# Patient Record
Sex: MALE | Race: WHITE | NOT HISPANIC OR LATINO
[De-identification: names, ages, dates, MRNs, and addresses within clinical notes are randomized per-mention and may not be internally consistent; named-entity substitution may affect disease eponyms.]

---
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
## 2018-11-25 ENCOUNTER — EMERGENCY (EMERGENCY)
Facility: HOSPITAL | Age: 18
LOS: 1 days | Discharge: HOME | End: 2018-11-25
Attending: EMERGENCY MEDICINE | Admitting: EMERGENCY MEDICINE
Payer: COMMERCIAL

## 2018-11-25 VITALS
SYSTOLIC BLOOD PRESSURE: 136 MMHG | RESPIRATION RATE: 18 BRPM | TEMPERATURE: 99 F | DIASTOLIC BLOOD PRESSURE: 86 MMHG | OXYGEN SATURATION: 97 % | HEART RATE: 100 BPM

## 2018-11-25 DIAGNOSIS — F45.22 BODY DYSMORPHIC DISORDER: ICD-10-CM

## 2018-11-25 PROCEDURE — 99283 EMERGENCY DEPT VISIT LOW MDM: CPT

## 2018-11-25 PROCEDURE — 90792 PSYCH DIAG EVAL W/MED SRVCS: CPT

## 2018-11-25 NOTE — ED BEHAVIORAL HEALTH ASSESSMENT NOTE - SUMMARY
18 year-old with body dysmorphic disorder and limited insight, not currently in treatment, presents with mother with sleep complaint but no suicidal ideations, of 2 year chronicity, not a candidate for involuntary psychiatric admission, does not want to be in a psychiatric hospital, plan is referral for outpatient treatment.

## 2018-11-25 NOTE — ED ADULT NURSE NOTE - CHIEF COMPLAINT QUOTE
pt states for the past year he has had anxiety and been unable to sleep, pt states he has been feeling depressed, denies SI, denies HI. pt states he has not seen a psychologist as of yet.  pt mother states pt never leaves the house and walks around with  towel covering his face.

## 2018-11-25 NOTE — ED BEHAVIORAL HEALTH NOTE - BEHAVIORAL HEALTH NOTE
following discharge mother contacted this provider asking for something "like valium for him to sleep tonight." Discussed with ED provider, do not think that benzo is indicated in this situation of initial insomnia  from the ED without beginning relationship with outpatient provider;  encouraged sleep hygiene, or melatonin/benadryl, and evaluation in the AM by outpatient psychiatry so that medication regimen can be developed for body dysmorphia.

## 2018-11-25 NOTE — ED PROVIDER NOTE - MEDICAL DECISION MAKING DETAILS
serge: many yr hx of anxiety; pt has been reluctant to seek care according to mother. pmh neg and no SI/HI. Psychiatry consulting.

## 2018-11-25 NOTE — ED BEHAVIORAL HEALTH ASSESSMENT NOTE - DESCRIPTION
Vital Signs Last 24 Hrs  T(C): 37.1 (25 Nov 2018 08:33), Max: 37.1 (25 Nov 2018 08:33)  T(F): 98.8 (25 Nov 2018 08:33), Max: 98.8 (25 Nov 2018 08:33)  HR: 100 (25 Nov 2018 08:33) (100 - 100)  BP: 136/86 (25 Nov 2018 08:33) (136/86 - 136/86)  BP(mean): --  RR: 18 (25 Nov 2018 08:33) (18 - 18)  SpO2: 97% (25 Nov 2018 08:33) (97% - 97%)    Patient somewhat anxious to get into gown and take his hat off, but calm and cooperative with process acne lives with mother and brother in [place]

## 2018-11-25 NOTE — ED BEHAVIORAL HEALTH ASSESSMENT NOTE - RISK ASSESSMENT
pt with chronic risk factors for suicide including untreated body dysmorphia, social isolation, limited insight. protective factors include supportive family, lack of substance abuse, lack of major depressions, lack of history of self harm, lack of suicidal ideations.

## 2018-11-25 NOTE — ED BEHAVIORAL HEALTH ASSESSMENT NOTE - HPI (INCLUDE ILLNESS QUALITY, SEVERITY, DURATION, TIMING, CONTEXT, MODIFYING FACTORS, ASSOCIATED SIGNS AND SYMPTOMS)
18 year-old male, single, no children, no history of substance abuse or arrests, lives with mother and brother in [place], not working, graduated high school but did not go to college, with 2 year history of preoccupation with acne that has led to multiple dermatology visits and procedures, cost over 2000 dollars out of pocket, and while acne has largely resolved he remains ruminative about his face, convinced that his appearance is scarred, spends time "looking for another dermatologist" but does not like to leave the house without some covering on his head, stays home without friends or social contacts. he reports that initially the acne was much worse, but was treated and "thank god it is still better, but I still think about the whole situation." He spends time checking his appearance, talks about "my face, I have pictures of how bad it was." He reports that when his mother makes him leave the house, "even like here to the ER, I feel so much better because I am not just focused on it." His mother has been trying to get him in to treatment for the past year, he has visited the [place] ED where he was referred to a psychiatrist but did not go, went once to the [facility], but did not get out of the car, decided he did not want to see a psychiatrist. He woke  up this morning and after recently having had some increased difficulty falling asleep, agreed to come to the ED "to get something for sleep." He denies other symptoms of depression, does not feel anhedonic (enjoys television enjoys exercising), has not generally had trouble with sleep until recently, eats well. He has had no suicidal thoughts, has never tried to hurt himself. No mood elevation. No darnell delusions (he has insight into the exaggerated nature of his perceived physical imperfection). No hallucinations. Consult requested for psychiatric referral, pt not requesting admission to the psychiatric hospital.    Collateral from mother corroborates above, she is very frustrated that he does not seek treatment, she is not sure what to do to help him accept treatment, corroborates that there are no acute safety issues

## 2018-11-25 NOTE — ED ADULT TRIAGE NOTE - CHIEF COMPLAINT QUOTE
pt states for the past year he has had anxiety and been unable to sleep, pt states he has been feeling depressed, denies SI, denies HI. pt states he has not seen a psychologist as of yet. pt states for the past year he has had anxiety and been unable to sleep, pt states he has been feeling depressed, denies SI, denies HI. pt states he has not seen a psychologist as of yet.  pt mother states pt never leaves the house and walks around with  towel covering his face.

## 2018-11-25 NOTE — ED PROVIDER NOTE - OBJECTIVE STATEMENT
serge: hx from pt and mother.   Pt states that he has anxiety for many years, and that he has difficulty sleeping. States that he is bothered by the diff sleeping (4 hours last note) and came to ED. Denies SI/HI. Denies being in behavioral care in the past. No meds. States he has intermittent abd pain for years and had blood testing early this year and told of labs being normal.   yury any PMH

## 2018-11-25 NOTE — ED PROVIDER NOTE - PROGRESS NOTE DETAILS
serge: pt seen by psychiatry and dx appears to be body dysmorphism. Pt needs to follow-up in crisis clinic. serge: pt asks for valium for sleep. pt and mother spoke with psychiatry who did not feel this was in pts best interest. they strongly recc f/u tomorrow at crisis center. Family then asked to speak with me. Made same request. I explained that it was important for the pts long term health that he obtain opt continued care ASAP and that they should go tomorrow. Pt and mom were very concerned about him not sleeping. I suggested 25mg otc benadryl to take tonite only (repeat once tonite if needed but not take any other nites unless recc tomorrow by psychiatry). I again emphasized the need to deal with the chronic condition pt has.

## 2018-11-25 NOTE — ED ADULT NURSE REASSESSMENT NOTE - NS ED NURSE REASSESS COMMENT FT1
pt calm & cooperative d/c by MD pt denies Si/Hi/AVh presently resources provided to pt for follow up care.

## 2018-11-27 ENCOUNTER — OUTPATIENT (OUTPATIENT)
Dept: OUTPATIENT SERVICES | Facility: HOSPITAL | Age: 18
LOS: 1 days | Discharge: STILL ADMITTED OR ADMINISTRATIVE | End: 2018-11-27

## 2018-11-28 DIAGNOSIS — F41.1 GENERALIZED ANXIETY DISORDER: ICD-10-CM

## 2019-03-16 ENCOUNTER — TRANSCRIPTION ENCOUNTER (OUTPATIENT)
Age: 19
End: 2019-03-16

## 2019-05-04 ENCOUNTER — TRANSCRIPTION ENCOUNTER (OUTPATIENT)
Age: 19
End: 2019-05-04

## 2019-12-23 ENCOUNTER — TRANSCRIPTION ENCOUNTER (OUTPATIENT)
Age: 19
End: 2019-12-23

## 2021-05-12 ENCOUNTER — TRANSCRIPTION ENCOUNTER (OUTPATIENT)
Age: 21
End: 2021-05-12

## 2021-09-08 PROBLEM — Z00.00 ENCOUNTER FOR PREVENTIVE HEALTH EXAMINATION: Status: ACTIVE | Noted: 2021-09-08

## 2021-09-10 ENCOUNTER — NON-APPOINTMENT (OUTPATIENT)
Age: 21
End: 2021-09-10

## 2021-09-10 ENCOUNTER — TRANSCRIPTION ENCOUNTER (OUTPATIENT)
Age: 21
End: 2021-09-10

## 2021-09-10 ENCOUNTER — APPOINTMENT (OUTPATIENT)
Dept: ORTHOPEDIC SURGERY | Facility: CLINIC | Age: 21
End: 2021-09-10
Payer: COMMERCIAL

## 2021-09-10 VITALS
HEIGHT: 73 IN | HEART RATE: 98 BPM | BODY MASS INDEX: 21.87 KG/M2 | WEIGHT: 165 LBS | DIASTOLIC BLOOD PRESSURE: 76 MMHG | SYSTOLIC BLOOD PRESSURE: 128 MMHG

## 2021-09-10 DIAGNOSIS — G56.22 LESION OF ULNAR NERVE, LEFT UPPER LIMB: ICD-10-CM

## 2021-09-10 PROCEDURE — 99204 OFFICE O/P NEW MOD 45 MIN: CPT

## 2021-09-10 NOTE — PHYSICAL EXAM
[de-identified] : - Constitutional: This is a male in no obvious distress.  \par - Psych: Patient is alert and oriented to person, place and time.  Patient has a normal mood and affect.\par - Cardiovascular: Normal pulses throughout the upper extremities.  No significant varicosities are noted in the upper extremities. \par - Neuro: Strength and sensation are intact throughout the upper extremities.  Patient has normal coordination.\par - Respiratory:  Patient exhibits no evidence of shortness of breath or difficulty breathing.\par - Skin: No rashes, lesions, or other abnormalities are noted in the upper extremities.\par \par --- \par \par Examination of his left elbow demonstrates a healed cubital tunnel incision.  There is mild swelling and tenderness.  There is a negative Tinel's sign overlying the ulnar nerve there is no instability of the ulnar nerve.  He has full flexion and extension of the elbow.  He has complaints of pain in the antecubital fossa but there is no localized swelling or tenderness.  His distal biceps tendon is intact.  There is no localized swelling or tenderness along the medial or lateral epicondyles.  He has normal sensation distally along the radial, ulnar and median nerve distributions.\par \par Examination of his right elbow demonstrates no obvious swelling.  He has a minimally positive Tinel's sign at the ulnar nerve at the cubital tunnel.  There is no instability of the ulnar nerve with flexion and extension of the elbow.  Again, he does have complaints of some pain in antecubital fossa, but there is no localized swelling or tenderness.  His distal biceps tendon is intact.  There is no localized swelling or tenderness along the medial or lateral epicondyles.  He has normal sensation distally along the radial, ulnar and median nerve distributions.\par \par Provocative signs for thoracic outlet syndrome are negative although he has slightly diminished pulse noted with abduction and external rotation of the shoulders. [de-identified] : An MRI of his left elbow from 12/21/2020 demonstrated common extensor tendinopathy and fraying of the humeral origin.  Hypertrophy of the medial head of the triceps tendon impinging upon the ulnar nerve.  Thickened lateral and superior synovial fold which is nonspecific.\par \par An MRI of his right elbow from 8/25/2021 demonstrated no abnormalities.

## 2021-09-10 NOTE — END OF VISIT
[FreeTextEntry3] : This note was written by Robyn Nye on 09/10/2021 acting solely as a scribe for Dr. Ellis Butler.\par  \par All medical record entries made by the Scribe were at my, Dr. Ellis Butler, direction and personally dictated by me on 09/10/2021. I have personally reviewed the chart and agree that the record accurately reflects my personal performance of the history, physical exam, assessment and plan.

## 2021-09-10 NOTE — ADDENDUM
[FreeTextEntry1] : I, Robyn Nye, acted solely as a scribe for Dr. Butler on this date on 09/10/2021.

## 2021-09-10 NOTE — DISCUSSION/SUMMARY
[FreeTextEntry1] : He has findings consistent with bilateral elbow pain of unclear etiology.  He is status post left ulnar nerve release at the cubital tunnel 4 months ago with improvement of his numbness and tingling but persistent elbow pain.  His examination is nonlocalized.\par \par I had a discussion with the patient regarding today's visit, the prognosis of this diagnosis, and treatment recommendations and options. \par \par I did tell him that I am not absolutely certain as to the etiology of his symptoms based upon the examination and history.  There are no concerning findings on the MRIs he has improved since his left cubital tunnel release.  I am not certain in terms of any particular intervention and he has had physical therapy in the past without improvement.  At this time, I recommended he see another physician who specializes in thoracic outlet syndrome. With regard to the time frame of healing status post cubital tunnel release, I told him that the recovery may take up to 6 months.  He will follow-up with me on an as-needed basis.\par \par He has agreed to the above plan of management and has expressed full understanding.  All questions were fully answered to their satisfaction. \par \par My cumulative time spent on this visit included: Preparation for the visit, review of the medical records, review of pertinent diagnostic studies, examination and counseling of the patient on the above diagnosis, treatment plan and prognosis, orders of diagnostic tests, medication and/or appropriate procedures and documentation in the medical records of today's visit.

## 2021-09-15 ENCOUNTER — APPOINTMENT (OUTPATIENT)
Dept: ORTHOPEDIC SURGERY | Facility: CLINIC | Age: 21
End: 2021-09-15

## 2024-03-02 ENCOUNTER — NON-APPOINTMENT (OUTPATIENT)
Age: 24
End: 2024-03-02

## 2024-03-03 ENCOUNTER — EMERGENCY (EMERGENCY)
Facility: HOSPITAL | Age: 24
LOS: 1 days | Discharge: HOME | End: 2024-03-03
Attending: EMERGENCY MEDICINE
Payer: MEDICAID

## 2024-03-03 VITALS
DIASTOLIC BLOOD PRESSURE: 85 MMHG | OXYGEN SATURATION: 99 % | HEIGHT: 72 IN | HEART RATE: 81 BPM | TEMPERATURE: 99 F | RESPIRATION RATE: 18 BRPM | SYSTOLIC BLOOD PRESSURE: 124 MMHG | WEIGHT: 164.91 LBS

## 2024-03-03 PROCEDURE — 99285 EMERGENCY DEPT VISIT HI MDM: CPT | Mod: 25

## 2024-03-03 RX ORDER — SODIUM CHLORIDE 9 MG/ML
1000 INJECTION INTRAMUSCULAR; INTRAVENOUS; SUBCUTANEOUS ONCE
Refills: 0 | Status: COMPLETED | OUTPATIENT
Start: 2024-03-03 | End: 2024-03-03

## 2024-03-03 NOTE — ED ADULT TRIAGE NOTE - CHIEF COMPLAINT QUOTE
Pt c/o dizziness, when he stands up he fells like he is moving' and weakness to both of his legs was seen at urgent care for same c/o and referred to come to the ER. Pt stated symptoms started yesterday.

## 2024-03-04 VITALS
RESPIRATION RATE: 18 BRPM | HEART RATE: 69 BPM | TEMPERATURE: 98 F | DIASTOLIC BLOOD PRESSURE: 66 MMHG | OXYGEN SATURATION: 99 % | SYSTOLIC BLOOD PRESSURE: 122 MMHG

## 2024-03-04 LAB
ALBUMIN SERPL ELPH-MCNC: 4.8 G/DL — SIGNIFICANT CHANGE UP (ref 3.5–5)
ALP SERPL-CCNC: 113 U/L — SIGNIFICANT CHANGE UP (ref 40–120)
ALT FLD-CCNC: 41 U/L DA — SIGNIFICANT CHANGE UP (ref 10–60)
ANION GAP SERPL CALC-SCNC: 5 MMOL/L — SIGNIFICANT CHANGE UP (ref 5–17)
APPEARANCE UR: CLEAR — SIGNIFICANT CHANGE UP
AST SERPL-CCNC: 39 U/L — SIGNIFICANT CHANGE UP (ref 10–40)
BASOPHILS # BLD AUTO: 0.05 K/UL — SIGNIFICANT CHANGE UP (ref 0–0.2)
BASOPHILS NFR BLD AUTO: 0.9 % — SIGNIFICANT CHANGE UP (ref 0–2)
BILIRUB SERPL-MCNC: 0.6 MG/DL — SIGNIFICANT CHANGE UP (ref 0.2–1.2)
BILIRUB UR-MCNC: NEGATIVE — SIGNIFICANT CHANGE UP
BUN SERPL-MCNC: 12 MG/DL — SIGNIFICANT CHANGE UP (ref 7–18)
CALCIUM SERPL-MCNC: 9.6 MG/DL — SIGNIFICANT CHANGE UP (ref 8.4–10.5)
CHLORIDE SERPL-SCNC: 104 MMOL/L — SIGNIFICANT CHANGE UP (ref 96–108)
CO2 SERPL-SCNC: 28 MMOL/L — SIGNIFICANT CHANGE UP (ref 22–31)
COLOR SPEC: YELLOW — SIGNIFICANT CHANGE UP
CREAT SERPL-MCNC: 1.02 MG/DL — SIGNIFICANT CHANGE UP (ref 0.5–1.3)
D DIMER BLD IA.RAPID-MCNC: <150 NG/ML DDU — SIGNIFICANT CHANGE UP
DIFF PNL FLD: NEGATIVE — SIGNIFICANT CHANGE UP
EGFR: 104 ML/MIN/1.73M2 — SIGNIFICANT CHANGE UP
EOSINOPHIL # BLD AUTO: 0.16 K/UL — SIGNIFICANT CHANGE UP (ref 0–0.5)
EOSINOPHIL NFR BLD AUTO: 2.9 % — SIGNIFICANT CHANGE UP (ref 0–6)
ETHANOL SERPL-MCNC: <3 MG/DL — SIGNIFICANT CHANGE UP (ref 0–10)
FLUAV AG NPH QL: SIGNIFICANT CHANGE UP
FLUBV AG NPH QL: SIGNIFICANT CHANGE UP
GLUCOSE SERPL-MCNC: 113 MG/DL — HIGH (ref 70–99)
GLUCOSE UR QL: NEGATIVE MG/DL — SIGNIFICANT CHANGE UP
HCT VFR BLD CALC: 42.3 % — SIGNIFICANT CHANGE UP (ref 39–50)
HGB BLD-MCNC: 13.6 G/DL — SIGNIFICANT CHANGE UP (ref 13–17)
HIV 1 & 2 AB SERPL IA.RAPID: SIGNIFICANT CHANGE UP
IMM GRANULOCYTES NFR BLD AUTO: 0.2 % — SIGNIFICANT CHANGE UP (ref 0–0.9)
KETONES UR-MCNC: NEGATIVE MG/DL — SIGNIFICANT CHANGE UP
LEUKOCYTE ESTERASE UR-ACNC: NEGATIVE — SIGNIFICANT CHANGE UP
LYMPHOCYTES # BLD AUTO: 1.48 K/UL — SIGNIFICANT CHANGE UP (ref 1–3.3)
LYMPHOCYTES # BLD AUTO: 26.7 % — SIGNIFICANT CHANGE UP (ref 13–44)
MAGNESIUM SERPL-MCNC: 2.1 MG/DL — SIGNIFICANT CHANGE UP (ref 1.6–2.6)
MCHC RBC-ENTMCNC: 27.2 PG — SIGNIFICANT CHANGE UP (ref 27–34)
MCHC RBC-ENTMCNC: 32.2 GM/DL — SIGNIFICANT CHANGE UP (ref 32–36)
MCV RBC AUTO: 84.6 FL — SIGNIFICANT CHANGE UP (ref 80–100)
MONOCYTES # BLD AUTO: 0.32 K/UL — SIGNIFICANT CHANGE UP (ref 0–0.9)
MONOCYTES NFR BLD AUTO: 5.8 % — SIGNIFICANT CHANGE UP (ref 2–14)
NEUTROPHILS # BLD AUTO: 3.53 K/UL — SIGNIFICANT CHANGE UP (ref 1.8–7.4)
NEUTROPHILS NFR BLD AUTO: 63.5 % — SIGNIFICANT CHANGE UP (ref 43–77)
NITRITE UR-MCNC: NEGATIVE — SIGNIFICANT CHANGE UP
NRBC # BLD: 0 /100 WBCS — SIGNIFICANT CHANGE UP (ref 0–0)
PCP SPEC-MCNC: SIGNIFICANT CHANGE UP
PH UR: 7 — SIGNIFICANT CHANGE UP (ref 5–8)
PHOSPHATE SERPL-MCNC: 3.1 MG/DL — SIGNIFICANT CHANGE UP (ref 2.5–4.5)
PLATELET # BLD AUTO: 191 K/UL — SIGNIFICANT CHANGE UP (ref 150–400)
POTASSIUM SERPL-MCNC: 3.7 MMOL/L — SIGNIFICANT CHANGE UP (ref 3.5–5.3)
POTASSIUM SERPL-SCNC: 3.7 MMOL/L — SIGNIFICANT CHANGE UP (ref 3.5–5.3)
PROT SERPL-MCNC: 8.3 G/DL — SIGNIFICANT CHANGE UP (ref 6–8.3)
PROT UR-MCNC: NEGATIVE MG/DL — SIGNIFICANT CHANGE UP
RBC # BLD: 5 M/UL — SIGNIFICANT CHANGE UP (ref 4.2–5.8)
RBC # FLD: 13.6 % — SIGNIFICANT CHANGE UP (ref 10.3–14.5)
SARS-COV-2 RNA SPEC QL NAA+PROBE: SIGNIFICANT CHANGE UP
SODIUM SERPL-SCNC: 137 MMOL/L — SIGNIFICANT CHANGE UP (ref 135–145)
SP GR SPEC: 1 — LOW (ref 1–1.03)
UROBILINOGEN FLD QL: 0.2 MG/DL — SIGNIFICANT CHANGE UP (ref 0.2–1)
WBC # BLD: 5.55 K/UL — SIGNIFICANT CHANGE UP (ref 3.8–10.5)
WBC # FLD AUTO: 5.55 K/UL — SIGNIFICANT CHANGE UP (ref 3.8–10.5)

## 2024-03-04 PROCEDURE — 85025 COMPLETE CBC W/AUTO DIFF WBC: CPT

## 2024-03-04 PROCEDURE — 81003 URINALYSIS AUTO W/O SCOPE: CPT

## 2024-03-04 PROCEDURE — 80307 DRUG TEST PRSMV CHEM ANLYZR: CPT

## 2024-03-04 PROCEDURE — 93005 ELECTROCARDIOGRAM TRACING: CPT

## 2024-03-04 PROCEDURE — 99283 EMERGENCY DEPT VISIT LOW MDM: CPT

## 2024-03-04 PROCEDURE — 80053 COMPREHEN METABOLIC PANEL: CPT

## 2024-03-04 PROCEDURE — 84100 ASSAY OF PHOSPHORUS: CPT

## 2024-03-04 PROCEDURE — 86703 HIV-1/HIV-2 1 RESULT ANTBDY: CPT

## 2024-03-04 PROCEDURE — 36415 COLL VENOUS BLD VENIPUNCTURE: CPT

## 2024-03-04 PROCEDURE — 87637 SARSCOV2&INF A&B&RSV AMP PRB: CPT

## 2024-03-04 PROCEDURE — 85379 FIBRIN DEGRADATION QUANT: CPT

## 2024-03-04 PROCEDURE — 83735 ASSAY OF MAGNESIUM: CPT

## 2024-03-04 RX ADMIN — SODIUM CHLORIDE 1000 MILLILITER(S): 9 INJECTION INTRAMUSCULAR; INTRAVENOUS; SUBCUTANEOUS at 00:24

## 2024-03-04 NOTE — ED PROVIDER NOTE - CLINICAL SUMMARY MEDICAL DECISION MAKING FREE TEXT BOX
Diagnostics reviewed and within normal limits.  Patient remains asymptomatic and well-appearing in ED walking with normal gait and eager to be discharged.  Patient states feels much better.  Pt is well appearing, has no new complaints and able to walk with normal gait. Pt is stable for discharge and follow up with medical doctor. Pt educated on care and need for follow up. Discussed anticipatory guidance and return precautions. Questions answered. I had a detailed discussion with the patient regarding the historical points, exam findings, and any diagnostic results supporting the discharge diagnosis.

## 2024-03-04 NOTE — ED PROVIDER NOTE - OBJECTIVE STATEMENT
24-year-old male patient states felt dizzy described as weak and lightheaded since this morning.  Patient states he was initially seen at an urgent care and referred to ED for further evaluation.  Patient states at urgent care he was informed that his vital signs were stable.  Patient denies any chest pain, no shortness of breath, no reported fever, no nausea, no vomiting, no loss of  consciousness or motor weakness.  Meds: Finasteride for hair loss

## 2024-03-04 NOTE — ED PROVIDER NOTE - NSFOLLOWUPCLINICS_GEN_ALL_ED_FT
[facility]  Internal Medicine  [address] [place] [address]  Phone: [phone]  Fax: [phone]

## 2024-03-04 NOTE — ED PROVIDER NOTE - PHYSICAL EXAMINATION
Kernig and Brudzinski signs area negative, no petechiae, no photophobia, no dysarthria, no facial asymmetry, no focal deficits.  No nystagmus, no ataxia  NIH stroke scale is zero. Pt passed dysphagia screen.

## 2024-03-04 NOTE — ED PROVIDER NOTE - PATIENT PORTAL LINK FT
You can access the FollowMyHealth Patient Portal offered by [facility] by registering at the following website: [email or web address]. By joining [email or web address] FollowMyHealth portal, you will also be able to view your health information using other applications (apps) compatible with our system.

## 2024-03-04 NOTE — ED PROVIDER NOTE - NSFOLLOWUPINSTRUCTIONS_ED_ALL_ED_FT
Return if you feel weak/ dizzy, have fever, pain, chest pain, shortness of breath, nausea vomiting any concerns.  Drink plenty of fluids.  See your doctor as soon as possible (within 1-2 days).   If you need further assistance for appointments you can contact the [facility] Care Coordinator at [phone] or the [facility] Patient Access Services helpline at [phone] to find names/contact #s for a practitioner to follow up with.  Bring your discharge papers / test results with you to any follow up appointments.   In addition our outpatient [facility] is located at [address] [place] [address], tel: [phone].

## 2024-06-19 NOTE — ED BEHAVIORAL HEALTH ASSESSMENT NOTE - [facility] ED COURSE PSYCHIATRIC MEDICATION GIVEN
EMERGENCY DEPARTMENT ENCOUNTER      CHIEF COMPLAINT    Chief Complaint   Patient presents with    Nausea    Vomiting    Diarrhea       HISTORY OF PRESENT ILLNESS    Delmer Smith is a 70 year old male with history of HTN, CKD, confirmed COVID positive 5/21/24 who presents to the ED with his wife for evaluation and treatment of nausea, vomiting, and diarrhea. His symptoms started four days ago. Pt does report he felt well for the last two days, but states these symptoms returned this morning. Pt does report approximately four episodes of non bloody non bilious emesis this morning with an additional four episodes of diarrhea. Denies hematemesis, melena, hematochezia. Does report increased urinary urgency, but otherwise denies frequency, dysuria, hematuria, malodorous urine. Reports mild chills. He ate breakfast this morning but did vomit after this. Pt does report his wife recently had similar symptoms. Denies recent travel. Has had recent antibiotics (azithromycin).     Pt also reports month long history of dry cough, which he does note is improving. On review of records, pt COVID positive 5/21/24 at which time he was prescribed paxlovid. He endorses worsening of his cough and generalized fatigue after this. Pt rx azithromycin and prednisone 5/30. On 6/3, rx dexamethasone and albuterol. Pt does state his symptoms are getting better but he reports lingering cough and fatigue.     Denies fever, sore throat,rhinorrhea/ nasal congestion, ear pain, rash, chest pain, palpitations, shortness of breath, abdominal pain, leg swelling.     I have reviewed Delmer Smith's previous internal medicine appointment from yesterday.  Note Review Summary: CXR ordered. Symptoms thought to be acute gastroenteritis and postviral cough/ fatigue.     ALLERGIES    ALLERGIES:  No Known Allergies    CURRENT MEDICATIONS    No current facility-administered medications for this encounter.     Current Outpatient Medications   Medication Sig Dispense  Refill    ondansetron (ZOFRAN ODT) 4 MG disintegrating tablet Place 1 tablet onto the tongue every 6 hours. 10 tablet 0    azithromycin (ZITHROMAX) 250 MG tablet [None received]      predniSONE (DELTASONE) 10 MG tablet [None received]      benazepril (LOTENSIN) 20 MG tablet Take 1 tablet by mouth daily.      dexAMETHasone (DECADRON) 6 MG tablet Take 1 tablet by mouth daily (with breakfast). 8 tablet 0    albuterol 108 (90 Base) MCG/ACT inhaler Inhale 2 puffs into the lungs every 6 hours as needed for Shortness of Breath or Other (congestion). 1 each 0    Calcium Carb-Cholecalciferol 600-10 MG-MCG Tab       benazepril (LOTENSIN) 20 MG tablet Take 20 mg by mouth daily.      allopurinol (ZYLOPRIM) 300 MG tablet Take 300 mg by mouth daily.      allopurinol (ZYLOPRIM) 100 MG tablet Take 100 mg by mouth daily.      guaiFENesin-codeine (GUAIFENESIN AC) 100-10 MG/5ML liquid Take 5 mLs by mouth every 6 hours as needed for Cough or Congestion. 180 mL 0    atorvastatin (LIPITOR) 10 MG tablet Take 10 mg by mouth daily.      chlorthalidone (THALITONE) 25 MG tablet TAKE 1 TABLET BY MOUTH EVERY DAY IN THE MORNING WITH FOOD      nebivolol (BYSTOLIC) 10 MG tablet Take 10 mg by mouth daily.         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       SURGICAL HISTORY    Past Surgical History:   Procedure Laterality Date    Repair ing hernia,<6mo,reducible         SOCIAL HISTORY    Social History     Tobacco Use    Smoking status: Never    Smokeless tobacco: Never   Substance Use Topics    Alcohol use: No       FAMILY HISTORY    Family History   Problem Relation Age of Onset    Blood Disorder Father         polycythemia    Blood Disorder Mother     Cancer Father         thyroid         REVIEW OF SYSTEMS    Constitutional:  See history of present illness  Eyes:  Denies eye pain  HENT:  Denies sore throat and ear pain   Respiratory:  Denies shortness of  breath   Cardiovascular:  Denies chest pain  GI:  see HPI  :  Denies dysuria   Musculoskeletal:  Denies back pain and neck pain   Integument:  Denies rash  Neurologic:  Denies headache      PHYSICAL EXAM    Vitals:    06/19/24 1345 06/19/24 1400 06/19/24 1415 06/19/24 1430   BP: 128/63  (!) 140/62    BP Location: RUE - Right upper extremity      Patient Position: Semi-Kumar's  Semi-Kumar's    Pulse: (!) 52 (!) 53 (!) 56 (!) 56   Resp: 14 18 18 18   Temp:       TempSrc:       SpO2: 98% 100% 99% 98%   Weight:           Constitutional:  Patient resting comfortably on exam .  Well developed, well nourished. No acute distress, non-toxic appearance.   Eyes:  Sclera anicteric. PERRL.  Eyes track well.   HENT:  Head is atraumatic. Mucus membranes are moist.   Neck:  Neck is supple. Intact active range of motion without discomfort.  Respiratory:  No respiratory distress. Lungs are clear to auscultation bilaterally. No rales or wheezing appreciated. No stridor.   Cardiovascular:  Regular rate, regular rhythm. Radial pulses 2+ bilaterally.  GI:  Intact normoactive bowel sounds. Abdomen is soft, non tender, non distended. Negative Dalal and McBurney point tenderness. Negative Rovsing, psoas, obturator signs. No rebound or jar tenderness.   :  No costovertebral angle tenderness.  Musculoskeletal:  No obvious deformities. Bilateral lower extremities are without edema.   Integument:  No jaundice or pallor. No obvious acute abdominal wall lesions. No diaphoresis. Capillary refill less than 2 seconds.   Neurologic:  GCS 15. Alert & oriented to conversation. Moves all extremities. Speech is clear.   Psychiatric:  Speech and behavior appropriate.       LABS    Results for orders placed or performed during the hospital encounter of 06/19/24   Light Blue Top   Result Value    Extra Tube Hold for Add Ons   Light Green Top   Result Value    Extra Tube Hold for Add Ons   Lavender Top   Result Value    Extra Tube Hold for Add Ons    Gold Top   Result Value    Extra Tube Hold for Add Ons   Grey Top Tube   Result Value    Extra Tube Hold for Add Ons   Comprehensive Metabolic Panel   Result Value    Fasting Status     Sodium 137    Potassium 3.6    Chloride 101    Carbon Dioxide 30    Anion Gap 10    Glucose 102 (H)    BUN 25 (H)    Creatinine 1.45 (H)    Glomerular Filtration Rate 51 (L)     Comment: eGFR 30-59 mL/min/1.73m2 = Moderate decrease in kidney function. Stage 3 CKD (chronic kidney disease) or moderate kidney disease. Estimated GFR calculated using the CKD-EPI-R (2021) equation that does not include race in the creatinine calculation.    BUN/Cr 17    Calcium 9.4    Bilirubin, Total 0.8    GOT/AST 28    GPT/ALT 47    Alkaline Phosphatase 71    Albumin 3.3 (L)    Protein, Total 6.6    Globulin 3.3    A/G Ratio 1.0   Lipase   Result Value    Lipase 92 (H)   COVID/Flu/RSV panel   Result Value    Rapid SARS-COV-2 by PCR Not Detected    Influenza A by PCR Not Detected    Influenza B by PCR Not Detected    RSV BY PCR Not Detected    Isolation Guidelines      Comment: Do not use this test result as the sole decision-maker for discontinuation of isolation.   Clinical evaluation should be considered for other respiratory illness requiring transmission-based isolation.    -    No fever (<99.0 F/37.2 C) for at least 24 hours without the use of fever-reducing medications    AND  -    Respiratory symptoms have improved or resolved (e.g. cough, shortness of breath)     AND  -    COVID-19 negative test    See COVID-19 Deisolation Resource Guide    Procedural Comment      Comment: SARS-COV-2 nucleic acid has not been detected indicating the absence of COVID-19.    This test was performed using the [employer] Xpert Xpress SARS-CoV-2/Flu/RSV RT-PCR test that has been given Emergency Use Authorization (EUA) by the [place] Food and Drug Administration (FDA). These tests are considered definitive and do not need to be confirmed by another method.   CBC  with Automated Differential (performable only)   Result Value    WBC 9.7    RBC 4.63    HGB 15.0    HCT 42.5    MCV 91.8    MCH 32.4    MCHC 35.3    RDW-CV 12.6    RDW-SD 41.5        NRBC 0    Neutrophil, Percent 75    Lymphocytes, Percent 13    Mono, Percent 9    Eosinophils, Percent 2    Basophils, Percent 0    Immature Granulocytes 1    Absolute Neutrophils 7.4    Absolute Lymphocytes 1.2    Absolute Monocytes 0.8    Absolute Eosinophils  0.2    Absolute Basophils 0.0    Absolute Immature Granulocytes 0.1   TROPONIN I, HIGH SENSITIVITY   Result Value    Troponin I, High Sensitivity 8   Urinalysis & Reflex Microscopy With Culture If Indicated   Result Value    COLOR, URINALYSIS Straw    APPEARANCE, URINALYSIS Clear    GLUCOSE, URINALYSIS Negative    BILIRUBIN, URINALYSIS Negative    KETONES, URINALYSIS Negative    SPECIFIC GRAVITY, URINALYSIS <1.005 (L)    OCCULT BLOOD, URINALYSIS Negative    PH, URINALYSIS 7.0    PROTEIN, URINALYSIS Negative    UROBILINOGEN, URINALYSIS 0.2    NITRITE, URINALYSIS Negative    LEUKOCYTE ESTERASE, URINALYSIS Negative   ECG   Result Value    Systolic Blood Pressure 147    Diastolic Blood Pressure 67    Ventricular Rate EKG/Min (BPM) 60    Atrial Rate (BPM) 60    WA-Interval (MSEC) 210    QRS-Interval (MSEC) 100    QT-Interval (MSEC) 410    QTc 410    P Axis (Degrees) 53    R Axis (Degrees) 34    T Axis (Degrees) 14    REPORT TEXT      Sinus rhythm  with 1st degree AV block  Otherwise normal ECG  No previous ECGs available  read at 1132  Confirmed by MD PAULINE, JOE ([id number]),  Nguyen Rodriguez ([id number]) on 6/19/2024 12:57:52 PM           RADIOLOGY    CT ABDOMEN PELVIS W CONTRAST   Final Result   Impression:      1. No acute process in the abdomen/pelvis.   2. Status post appendectomy.      Electronically Signed by: James Wong MD   Signed on: 6/19/2024 1:45 PM   Created on Workstation ID: [id number]   Signed on Workstation ID: [id number]      XR CHEST PA AND LATERAL 2 VIEWS    Final Result   IMPRESSION: No acute cardiopulmonary disease.      Electronically Signed by: Chevy Gottlieb MD   Signed on: 6/19/2024 1:31 PM   Created on Workstation ID: [id number]   Signed on Workstation ID: [id number]        EKG  Per my review of the EKG tracing, my findings are listed below.  Final interpretation is pending cardiology review.   Rhythm:  Sinus rhythm with 1st degree AV block.   Rate:  60  QTc:  410  ST Segments:  No ST elevation  T Waves:  No t wave inversions  Clinical Impression:  Sinus rhythm with 1st degree AV block. No prior for comparison.   Interpreted By:  ED Physician    ED TREATMENTS  Medications   ondansetron (ZOFRAN) injection 4 mg (4 mg Intravenous Given 6/19/24 1224)   sodium chloride (NORMAL SALINE) 0.9 % bolus 1,000 mL (0 mLs Intravenous Completed 6/19/24 1329)   acetaminophen (TYLENOL) tablet 650 mg (650 mg Oral Given 6/19/24 1228)   iohexol (OMNIPAQUE 300) contrast solution 100 mL (100 mLs Intravenous Given 6/19/24 1309)   sodium chloride 0.9 % injector flush 100 mL (100 mLs Injection Given 6/19/24 1309)         ED COURSE & MEDICAL DECISION MAKING    70 year old male who presents with four days of nausea, vomiting, and diarrhea. Pt also dx with COVID in late May and reports lingering improving dry cough with fatigue.     Patient is nontoxic appearing. Vital signs physiologic, although pt slightly bradycardic consistent with prior visits.     Differential diagnosis to include viral gastroenteritis, COVID/ flu/ RSV, pancreatitis, diverticulitis, mass, pneumonia, bronchitis, post viral cough. Plan for CBC, CMP, lipase, COVID/ flu/ RSV, CXR, troponin, UA.     Pt given zofran, acetaminophen, 1 L NS in ED. He reports vast improvement in his symptoms with these interventions.     CBC without leukocytosis, anemia, left shift.  Chemistry with BUN 25, creatinine 1.49 which is improved from last reading.  COVID flu RSV negative.  UA negative for infection.  CT without acute process in the  abdomen and pelvis.    Troponin 8.  EKG nonischemic.  Chest x-ray unremarkable.     Patient tolerate p.o. challenge in the ED without issue.     Return precautions discussed, including but not limited to inability to tolerate PO intake, abdominal pain, worsening fatigue/ weakness. All questions and concerns addressed. Treatment plan to include supportive measures, PRN zofran, and follow-up with family practice in the next few days. Patient discharged in stable and improved condition.    IMPRESSION  The encounter diagnosis was Nausea vomiting and diarrhea.      JOHNATHON GuevaraC    Supervising physician: Dr. Amado      I discussed/staffed case with Dr. Amado, who agrees with the assessment and plan outlined above.       Drea Byers PA  06/19/24 1676     None

## 2025-03-06 NOTE — ED BEHAVIORAL HEALTH ASSESSMENT NOTE - NS ED [facility] OTHER STREET DRUGS MEDICATION
AVS virtually reviewed with patient in its entirety with emphasis on diet, medications, follow-up appointments and reasons to return to the ED or contact the [facility] On Call Nurse Care Line. Patient also encouraged to utilize their patient portal. Ease and convenience of use reiterated. Education complete and patient voiced understanding. All questions answered. Discharge teaching complete.    None known